# Patient Record
Sex: MALE | Race: ASIAN | URBAN - METROPOLITAN AREA
[De-identification: names, ages, dates, MRNs, and addresses within clinical notes are randomized per-mention and may not be internally consistent; named-entity substitution may affect disease eponyms.]

---

## 2017-02-16 ENCOUNTER — EMERGENCY (EMERGENCY)
Facility: HOSPITAL | Age: 37
LOS: 1 days | Discharge: HOME | End: 2017-02-16
Attending: EMERGENCY MEDICINE | Admitting: EMERGENCY MEDICINE
Payer: OTHER MISCELLANEOUS

## 2017-02-16 VITALS
TEMPERATURE: 98 F | DIASTOLIC BLOOD PRESSURE: 91 MMHG | HEART RATE: 73 BPM | RESPIRATION RATE: 16 BRPM | SYSTOLIC BLOOD PRESSURE: 130 MMHG | OXYGEN SATURATION: 100 %

## 2017-02-16 VITALS
OXYGEN SATURATION: 100 % | RESPIRATION RATE: 16 BRPM | DIASTOLIC BLOOD PRESSURE: 89 MMHG | HEART RATE: 68 BPM | SYSTOLIC BLOOD PRESSURE: 123 MMHG | TEMPERATURE: 98 F

## 2017-02-16 PROCEDURE — 73562 X-RAY EXAM OF KNEE 3: CPT | Mod: 26,LT

## 2017-02-16 PROCEDURE — 99283 EMERGENCY DEPT VISIT LOW MDM: CPT

## 2017-02-16 RX ORDER — IBUPROFEN 200 MG
1 TABLET ORAL
Qty: 30 | Refills: 0 | OUTPATIENT
Start: 2017-02-16

## 2017-02-16 RX ORDER — IBUPROFEN 200 MG
600 TABLET ORAL ONCE
Qty: 0 | Refills: 0 | Status: COMPLETED | OUTPATIENT
Start: 2017-02-16 | End: 2017-02-16

## 2017-02-16 RX ADMIN — Medication 600 MILLIGRAM(S): at 18:09

## 2017-02-16 NOTE — ED PROVIDER NOTE - PROGRESS NOTE DETAILS
SHIMA Pruitt: Received sign out from SHIMA COTO to reassess patient and follow up on xray results. Pt reassessed, still unable to bear weight although pain is better after Motrin. Likely meniscal injury. Will dc w/ knee immobilizer and crutches w/ ortho follow up

## 2017-02-16 NOTE — ED PROVIDER NOTE - LOWER EXTREMITY EXAM, LEFT
EFFUSION/TENDERNESS/Infrapatellar medial tenderness. Small effusion. Patient is able to extend knee passively. Pain on extension actively.  No tenderness over patellar tendon. Non tender ankle. FROM.  Hip: NT, FROM

## 2017-02-16 NOTE — ED PROCEDURE NOTE - NS ED PERI VASCULAR NEG
fingers/toes warm to touch/no paresthesia/no swelling/no cyanosis of extremity/capillary refill time < 2 seconds

## 2017-02-16 NOTE — ED PROVIDER NOTE - NS ED MD SCRIBE ATTENDING SCRIBE SECTIONS
HISTORY OF PRESENT ILLNESS/HIV/PAST MEDICAL/SURGICAL/SOCIAL HISTORY/REVIEW OF SYSTEMS/VITAL SIGNS( Pullset)/PHYSICAL EXAM/DISPOSITION

## 2017-02-16 NOTE — ED PROVIDER NOTE - ATTENDING CONTRIBUTION TO CARE
DR. Benjamin, UPFRONT ATTENDING MD-  I was the attending upfront in Triage today and I performed the initial face to face bedside interview with patient regarding history of present illness, review of symptoms and past medical history. I completed an independent physical exam.  Since I was the inital provider who evalauted this patient, the history, ROS and examination was documented by me in the note.  I have signed out the follow up of any pending tests (ie. labs and/or radiological studies) to the PA.  I have discussed patient's plan of care and disposition with QUYNH benjamin  The scribe's documentation has been prepared under my direction and personally reviewed by me in its entirety. I confirm that the note above accurately reflects all work, treatment, procedures, and medical decision making performed by hiren benjamin

## 2017-02-16 NOTE — ED PROVIDER NOTE - PLAN OF CARE
Follow up with ortho this week.  Referral list provided.  Rest, ice and elevate knee.  Ambulate as tolerated with use of crutches.  Keep knee immobilizer on during the day.  Take Motrin 600mg every 8 hrs for pain with food. Worsening pain, swelling, weakness, numbness return to ER

## 2017-02-16 NOTE — ED PROVIDER NOTE - OBJECTIVE STATEMENT
35 y/o M pt with no significant PMHx presents to the ED for sudden onset of non radiating left knee pain worsened with weight bearing and extension and minimal associated swelling s/p injury this evening at work in which he twisted his left knee while bending down to grab something. 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 No prior knee injury or surgeries. Denies ankle pain or hip pain.

## 2017-02-16 NOTE — ED ADULT TRIAGE NOTE - CHIEF COMPLAINT QUOTE
C/o left knee pain after pt bent down. Was unable to walk or bear weight on left knee. Denies fall/injury/past injury to knee.

## 2017-02-16 NOTE — ED PROVIDER NOTE - MEDICAL DECISION MAKING DETAILS
Plan: likely meniscal vs MCL injury vs sprain. Plan: XR to r/o fracture, pain control, likely knee immobilizer and discharge with ortho follow up.

## 2017-02-16 NOTE — ED PROVIDER NOTE - CARE PLAN
Principal Discharge DX:	Knee sprain  Instructions for follow-up, activity and diet:	Follow up with ortho this week.  Referral list provided.  Rest, ice and elevate knee.  Ambulate as tolerated with use of crutches.  Keep knee immobilizer on during the day.  Take Motrin 600mg every 8 hrs for pain with food. Worsening pain, swelling, weakness, numbness return to ER

## 2017-02-17 RX ORDER — IBUPROFEN 200 MG
1 TABLET ORAL
Qty: 30 | Refills: 0 | OUTPATIENT
Start: 2017-02-17

## 2017-02-17 NOTE — ED POST DISCHARGE NOTE - REASON FOR FOLLOW-UP
Other Pt called from [facility] stating rx was not at pharmacy. In prescription writer saw  mg was printed. Re-sent , esubmit successful.

## 2017-03-09 ENCOUNTER — APPOINTMENT (OUTPATIENT)
Dept: ORTHOPEDIC SURGERY | Facility: CLINIC | Age: 37
End: 2017-03-09

## 2017-03-09 VITALS
SYSTOLIC BLOOD PRESSURE: 124 MMHG | BODY MASS INDEX: 20.37 KG/M2 | DIASTOLIC BLOOD PRESSURE: 79 MMHG | WEIGHT: 142 LBS | HEART RATE: 74 BPM

## 2017-03-09 DIAGNOSIS — M25.562 PAIN IN LEFT KNEE: ICD-10-CM

## 2017-03-09 DIAGNOSIS — Z78.9 OTHER SPECIFIED HEALTH STATUS: ICD-10-CM

## 2017-03-09 DIAGNOSIS — Z60.2 PROBLEMS RELATED TO LIVING ALONE: ICD-10-CM

## 2017-03-09 DIAGNOSIS — Z80.42 FAMILY HISTORY OF MALIGNANT NEOPLASM OF PROSTATE: ICD-10-CM

## 2017-03-09 SDOH — SOCIAL STABILITY - SOCIAL INSECURITY: PROBLEMS RELATED TO LIVING ALONE: Z60.2

## 2017-03-23 ENCOUNTER — OUTPATIENT (OUTPATIENT)
Dept: OUTPATIENT SERVICES | Facility: HOSPITAL | Age: 37
LOS: 1 days | End: 2017-03-23
Payer: COMMERCIAL

## 2017-03-23 ENCOUNTER — APPOINTMENT (OUTPATIENT)
Dept: MRI IMAGING | Facility: CLINIC | Age: 37
End: 2017-03-23

## 2017-03-23 DIAGNOSIS — Z00.8 ENCOUNTER FOR OTHER GENERAL EXAMINATION: ICD-10-CM

## 2017-03-23 PROCEDURE — 73721 MRI JNT OF LWR EXTRE W/O DYE: CPT

## 2017-03-29 ENCOUNTER — OUTPATIENT (OUTPATIENT)
Dept: OUTPATIENT SERVICES | Facility: HOSPITAL | Age: 37
LOS: 1 days | End: 2017-03-29

## 2017-03-29 VITALS
HEART RATE: 72 BPM | WEIGHT: 149.03 LBS | HEIGHT: 70.5 IN | DIASTOLIC BLOOD PRESSURE: 82 MMHG | TEMPERATURE: 97 F | RESPIRATION RATE: 16 BRPM | SYSTOLIC BLOOD PRESSURE: 120 MMHG

## 2017-03-29 DIAGNOSIS — S83.242A OTHER TEAR OF MEDIAL MENISCUS, CURRENT INJURY, LEFT KNEE, INITIAL ENCOUNTER: ICD-10-CM

## 2017-03-29 DIAGNOSIS — S83.209A UNSPECIFIED TEAR OF UNSPECIFIED MENISCUS, CURRENT INJURY, UNSPECIFIED KNEE, INITIAL ENCOUNTER: ICD-10-CM

## 2017-03-29 LAB
HCT VFR BLD CALC: 40.1 % — SIGNIFICANT CHANGE UP (ref 39–50)
HGB BLD-MCNC: 12.2 G/DL — LOW (ref 13–17)
MCHC RBC-ENTMCNC: 23.6 PG — LOW (ref 27–34)
MCHC RBC-ENTMCNC: 30.4 % — LOW (ref 32–36)
MCV RBC AUTO: 77.7 FL — LOW (ref 80–100)
PLATELET # BLD AUTO: 210 K/UL — SIGNIFICANT CHANGE UP (ref 150–400)
PMV BLD: 12.3 FL — SIGNIFICANT CHANGE UP (ref 7–13)
RBC # BLD: 5.16 M/UL — SIGNIFICANT CHANGE UP (ref 4.2–5.8)
RBC # FLD: 13.5 % — SIGNIFICANT CHANGE UP (ref 10.3–14.5)
WBC # BLD: 5.92 K/UL — SIGNIFICANT CHANGE UP (ref 3.8–10.5)
WBC # FLD AUTO: 5.92 K/UL — SIGNIFICANT CHANGE UP (ref 3.8–10.5)

## 2017-03-29 NOTE — H&P PST ADULT - MUSCULOSKELETAL
details… detailed exam diminished strength/left knee/no calf tenderness/decreased ROM due to pain/joint swelling

## 2017-03-29 NOTE — H&P PST ADULT - NSANTHOSAYNRD_GEN_A_CORE
No. DUSTY screening performed.  STOP BANG Legend: 0-2 = LOW Risk; 3-4 = INTERMEDIATE Risk; 5-8 = HIGH Risk

## 2017-03-29 NOTE — H&P PST ADULT - HISTORY OF PRESENT ILLNESS
37 y/o healthy male presents for 37 y/o healthy male presents for preop evaluation for left knee medial meniscus tear. Pt states he injured his left knee on the job on2/16/17. Pt was lifting & squatting when he felt sharp pain  left knee; went to ED at [facility]; had x-ray of left knee & was referred to Dr Chiang for further evaluation Pt had MRI left knee & meniscus tear seen. Scheduled for left medial meniscus repair on 04/05/17.

## 2017-03-29 NOTE — H&P PST ADULT - FAMILY HISTORY
Mother  Still living? Unknown  Family history of diabetes mellitus (DM), Age at diagnosis: Age Unknown     Father  Still living? No  Family history of prostate cancer in father, Age at diagnosis: Age Unknown  Family history of diabetes mellitus (DM), Age at diagnosis: Age Unknown

## 2017-03-29 NOTE — H&P PST ADULT - RS GEN PE MLT RESP DETAILS PC
no rales/respirations non-labored/breath sounds equal/no wheezes/airway patent/clear to auscultation bilaterally/no rhonchi

## 2017-03-29 NOTE — H&P PST ADULT - PROBLEM SELECTOR PLAN 1
scheduled for left medial meniscus repair on 04/05/17  pending labs; surgical scrub & preop instructions  given & explained, pt verbalized understanding

## 2017-03-29 NOTE — H&P PST ADULT - NEGATIVE ENMT SYMPTOMS
no tinnitus/no nose bleeds/no vertigo/no hearing difficulty/no ear pain/no gum bleeding/no sinus symptoms/no nasal congestion

## 2017-03-29 NOTE — H&P PST ADULT - NEGATIVE GENERAL GENITOURINARY SYMPTOMS
no flank pain L/no flank pain R/no hematuria/no nocturia/no dysuria/no urinary hesitancy/no bladder infections/no renal colic/normal urinary frequency

## 2017-03-29 NOTE — H&P PST ADULT - NEGATIVE CARDIOVASCULAR SYMPTOMS
no peripheral edema/no chest pain/no dyspnea on exertion/no orthopnea/no paroxysmal nocturnal dyspnea/no palpitations

## 2017-04-04 ENCOUNTER — OTHER (OUTPATIENT)
Age: 37
End: 2017-04-04

## 2017-04-04 RX ORDER — OXYCODONE AND ACETAMINOPHEN 5; 325 MG/1; MG/1
5-325 TABLET ORAL
Qty: 50 | Refills: 0 | Status: ACTIVE | COMMUNITY
Start: 2017-04-04 | End: 1900-01-01

## 2017-04-05 ENCOUNTER — OUTPATIENT (OUTPATIENT)
Dept: OUTPATIENT SERVICES | Facility: HOSPITAL | Age: 37
LOS: 1 days | Discharge: HOME | End: 2017-04-05
Payer: OTHER MISCELLANEOUS

## 2017-04-05 ENCOUNTER — TRANSCRIPTION ENCOUNTER (OUTPATIENT)
Age: 37
End: 2017-04-05

## 2017-04-05 ENCOUNTER — APPOINTMENT (OUTPATIENT)
Dept: ORTHOPEDIC SURGERY | Facility: AMBULATORY SURGERY CENTER | Age: 37
End: 2017-04-05

## 2017-04-05 VITALS
SYSTOLIC BLOOD PRESSURE: 128 MMHG | HEART RATE: 55 BPM | OXYGEN SATURATION: 100 % | DIASTOLIC BLOOD PRESSURE: 64 MMHG | RESPIRATION RATE: 14 BRPM

## 2017-04-05 VITALS
DIASTOLIC BLOOD PRESSURE: 82 MMHG | SYSTOLIC BLOOD PRESSURE: 120 MMHG | HEART RATE: 72 BPM | TEMPERATURE: 97 F | HEIGHT: 70.5 IN | RESPIRATION RATE: 16 BRPM | WEIGHT: 149.03 LBS

## 2017-04-05 DIAGNOSIS — S83.242A OTHER TEAR OF MEDIAL MENISCUS, CURRENT INJURY, LEFT KNEE, INITIAL ENCOUNTER: ICD-10-CM

## 2017-04-05 PROCEDURE — 29881 ARTHRS KNE SRG MNISECTMY M/L: CPT | Mod: LT

## 2017-04-05 NOTE — ASU DISCHARGE PLAN (ADULT/PEDIATRIC). - SPECIAL INSTRUCTIONS
IF any written instructions differ from what your surgeon spoke with you about, please follow what your surgeon instructed over the pre-printed instructions. apply ice 20minutes ON 20minutes OFF

## 2017-04-05 NOTE — ASU DISCHARGE PLAN (ADULT/PEDIATRIC). - NOTIFY
Bleeding that does not stop/Persistent Nausea and Vomiting/Pain not relieved by Medications/Swelling that continues/Numbness, color, or temperature change to extremity/Fever greater than 101

## 2017-04-17 ENCOUNTER — APPOINTMENT (OUTPATIENT)
Dept: ORTHOPEDIC SURGERY | Facility: CLINIC | Age: 37
End: 2017-04-17

## 2017-05-01 ENCOUNTER — APPOINTMENT (OUTPATIENT)
Dept: ORTHOPEDIC SURGERY | Facility: CLINIC | Age: 37
End: 2017-05-01

## 2017-05-01 VITALS — HEIGHT: 70 IN | WEIGHT: 142 LBS | BODY MASS INDEX: 20.33 KG/M2

## 2017-06-01 ENCOUNTER — APPOINTMENT (OUTPATIENT)
Dept: ORTHOPEDIC SURGERY | Facility: CLINIC | Age: 37
End: 2017-06-01

## 2017-06-05 ENCOUNTER — MESSAGE (OUTPATIENT)
Age: 37
End: 2017-06-05

## 2017-07-13 ENCOUNTER — APPOINTMENT (OUTPATIENT)
Dept: ORTHOPEDIC SURGERY | Facility: CLINIC | Age: 37
End: 2017-07-13

## 2017-07-13 RX ORDER — NAPROXEN 500 MG/1
500 TABLET ORAL
Qty: 30 | Refills: 2 | Status: ACTIVE | COMMUNITY
Start: 2017-07-13 | End: 1900-01-01

## 2018-02-24 ENCOUNTER — EMERGENCY (EMERGENCY)
Facility: HOSPITAL | Age: 38
LOS: 1 days | Discharge: HOME | End: 2018-02-24
Attending: EMERGENCY MEDICINE | Admitting: EMERGENCY MEDICINE
Payer: COMMERCIAL

## 2018-02-24 VITALS
TEMPERATURE: 98 F | RESPIRATION RATE: 18 BRPM | DIASTOLIC BLOOD PRESSURE: 58 MMHG | HEART RATE: 83 BPM | SYSTOLIC BLOOD PRESSURE: 112 MMHG | OXYGEN SATURATION: 100 %

## 2018-02-24 PROCEDURE — 99284 EMERGENCY DEPT VISIT MOD MDM: CPT

## 2018-02-24 PROCEDURE — 71046 X-RAY EXAM CHEST 2 VIEWS: CPT | Mod: 26

## 2018-02-24 NOTE — ED PROVIDER NOTE - ATTENDING CONTRIBUTION TO CARE
I performed a face to face bedside interview with patient regarding history of present illness, review of symptoms and past medical history. I completed an independent physical exam.  I have discussed patient's plan of care.   I agree with note as stated above, having amended the EMR as needed to reflect my findings. I have discussed the assessment and plan of care.  This includes during the time I functioned as the attending physician for this patient.  Attending Contribution to Care: agree with plan of resident. pt p/w cp, ekg nsr with early repol. no hocm, brugada, prolonged qt. cxr neg. symptomatically improved. stable for d/c.

## 2018-02-24 NOTE — ED PROVIDER NOTE - MEDICAL DECISION MAKING DETAILS
37 healthy male with chest pain sent by Urgent care for concerning EKG.  EKG shows early repolirazation.  Pain improved at this time.  Pt PERC negative.  Will obtain cxr, repeat EKG and reassess.  - Viola Kurtz DO

## 2018-02-24 NOTE — ED PROVIDER NOTE - OBJECTIVE STATEMENT
37M no pmh p/w chest pain since last night.  Pt states pain was burning, worse with movement and worse laying down.  Pain is left sided and sometimes he feels in in his left side and left back.   Pt was at work today and pain was worse so he went to urgent care where they did and EKG, gave a "shot" for pain and sent patient to ED due to EKG.  Pt states his pain is improved and down to a 3/10 at this moment.  Denies associated shortness of breath, nausea/vomiting/diarrhea, fever, chills.  No fam hx of cardiac disease.  NO recent trave, no sick contacts.  - Viola Kurtz,

## 2018-02-24 NOTE — ED ADULT TRIAGE NOTE - CHIEF COMPLAINT QUOTE
pt developed cp at work and then went to pmd who did ekg and was told to come to er . pt had left sided cp thru to back worse on inspiration and when lying down. pt was given a shot for pain in office/ does not know the name/ states 4/10 now

## 2019-04-11 ENCOUNTER — APPOINTMENT (OUTPATIENT)
Dept: ORTHOPEDIC SURGERY | Facility: CLINIC | Age: 39
End: 2019-04-11
Payer: OTHER MISCELLANEOUS

## 2019-04-11 DIAGNOSIS — S83.212D BUCKET-HANDLE TEAR OF MEDIAL MENISCUS, CURRENT INJURY, LEFT KNEE, SUBSEQUENT ENCOUNTER: ICD-10-CM

## 2019-04-11 PROCEDURE — 99214 OFFICE O/P EST MOD 30 MIN: CPT

## 2019-04-11 NOTE — HISTORY OF PRESENT ILLNESS
[de-identified] : 38 year old male status post left knee bucket handle medial meniscectomy on 4.5.17, presents for follow up. He is here to close his WC case. He reports intermittent pain with squatting. Patient initially struggled with his postoperative recovery despite eradication of medial joint pain and locking sensation. Patient reported patellofemoral crepitus and loss of patellofemoral function postoperatively. Continues to report apprehension with return to high impact activity.

## 2019-04-11 NOTE — DISCUSSION/SUMMARY
[de-identified] : 38-year-old male status post left knee bucket-handle medial meniscectomy\par \par Discussed with patient that he has some mild loss of flexion, with hesitation to return to impact loading activity. Discussed that given partial meniscectomy, the patient will likely have continued intermittent symptoms given loss of meniscal function.\par \par Recommendation: Activity to tolerance. No restriction.\par \par Followup p.r.n.\par \par **Workers Compensation Percentage Loss (%);**\par \par Mild loss flexion 130deg vs 145deg = 10%\par \par The above evaluation was performed by myself, a board certified orthopedic surgeon, and represents my best objective professional opinion of the claimants medical condition, medical impairment and functional abilities. Percentage loss was calculated per [place] Workers Compensation Guidlines for Determining Impairment as per Nov 22nd, 2017.

## 2019-04-11 NOTE — PHYSICAL EXAM
[de-identified] : Oriented to time, place, person\par Mood: Normal\par Affect: Normal\par Appearance: Healthy, well appearing, no acute distress.\par Gait: Normal\par Assistive Devices: None\par \par Left knee exam\par \par Skin: Clean, dry, intact\par Inspection: No obvious malalignment, no masses, no swelling, no effusion\par Pulses: 2+ DP/PT pulses\par ROM: 0-130 degrees of flexion (R=145). Mild pain with deep knee flexion.\par Tenderness: Trace MJLT. No LJLT. Mild pain over the patella facets. No pain to the quadriceps tendon. No pain to the patella tendon. No posterior knee tenderness.\par Stability: Stable to varus, valgus. Negative lachman testing. Negative anterior drawer, negative posterior drawer.\par Strength: 5/5 Q/H/TA/GS/EHL, without atrophy\par Neuro: In tact to light touch throughout, DTR's normal\par Additional tests: Negative McMurrays test, Negative patellar grind test. \par \par

## 2019-08-14 NOTE — BRIEF OPERATIVE NOTE - ASSISTANT(S)
yolanda miller Surgeon/Pathologist Verbiage (Will Incorporate Name Of Surgeon From Intro If Not Blank): operated in two distinct and integrated capacities as the surgeon and pathologist.

## 2019-08-23 ENCOUNTER — APPOINTMENT (OUTPATIENT)
Dept: ULTRASOUND IMAGING | Facility: IMAGING CENTER | Age: 39
End: 2019-08-23

## 2019-08-23 ENCOUNTER — OUTPATIENT (OUTPATIENT)
Dept: OUTPATIENT SERVICES | Facility: HOSPITAL | Age: 39
LOS: 1 days | End: 2019-08-23

## 2019-08-23 DIAGNOSIS — B18.1 CHRONIC VIRAL HEPATITIS B WITHOUT DELTA-AGENT: ICD-10-CM

## 2019-08-24 ENCOUNTER — APPOINTMENT (OUTPATIENT)
Dept: ULTRASOUND IMAGING | Facility: IMAGING CENTER | Age: 39
End: 2019-08-24
Payer: COMMERCIAL

## 2019-08-24 ENCOUNTER — OUTPATIENT (OUTPATIENT)
Dept: OUTPATIENT SERVICES | Facility: HOSPITAL | Age: 39
LOS: 1 days | End: 2019-08-24
Payer: COMMERCIAL

## 2019-08-24 DIAGNOSIS — B18.1 CHRONIC VIRAL HEPATITIS B WITHOUT DELTA-AGENT: ICD-10-CM

## 2019-08-24 PROCEDURE — 76705 ECHO EXAM OF ABDOMEN: CPT

## 2019-08-24 PROCEDURE — 76705 ECHO EXAM OF ABDOMEN: CPT | Mod: 26

## 2019-10-02 PROBLEM — Z60.2 PERSON LIVING ALONE: Status: ACTIVE | Noted: 2017-03-09

## 2020-01-23 ENCOUNTER — EMERGENCY (EMERGENCY)
Facility: HOSPITAL | Age: 40
LOS: 1 days | Discharge: HOME | End: 2020-01-23
Attending: EMERGENCY MEDICINE | Admitting: EMERGENCY MEDICINE
Payer: COMMERCIAL

## 2020-01-23 VITALS
SYSTOLIC BLOOD PRESSURE: 113 MMHG | TEMPERATURE: 98 F | DIASTOLIC BLOOD PRESSURE: 73 MMHG | HEART RATE: 86 BPM | OXYGEN SATURATION: 100 % | RESPIRATION RATE: 17 BRPM

## 2020-01-23 PROCEDURE — 99283 EMERGENCY DEPT VISIT LOW MDM: CPT

## 2020-01-23 RX ORDER — TETANUS TOXOID, REDUCED DIPHTHERIA TOXOID AND ACELLULAR PERTUSSIS VACCINE, ADSORBED 5; 2.5; 8; 8; 2.5 [IU]/.5ML; [IU]/.5ML; UG/.5ML; UG/.5ML; UG/.5ML
0.5 SUSPENSION INTRAMUSCULAR ONCE
Refills: 0 | Status: COMPLETED | OUTPATIENT
Start: 2020-01-23 | End: 2020-01-23

## 2020-01-23 RX ADMIN — TETANUS TOXOID, REDUCED DIPHTHERIA TOXOID AND ACELLULAR PERTUSSIS VACCINE, ADSORBED 0.5 MILLILITER(S): 5; 2.5; 8; 8; 2.5 SUSPENSION INTRAMUSCULAR at 19:02
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
## 2020-01-23 NOTE — ED PROVIDER NOTE - PATIENT PORTAL LINK FT
You can access the FollowMyHealth Patient Portal offered by [facility] by registering at the following website: [email or web address]. By joining [employer]’s FollowMyHealth portal, you will also be able to view your health information using other applications (apps) compatible with our system.
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
## 2020-02-04 ENCOUNTER — EMERGENCY (EMERGENCY)
Facility: HOSPITAL | Age: 40
LOS: 1 days | Discharge: HOME | End: 2020-02-04
Attending: EMERGENCY MEDICINE | Admitting: EMERGENCY MEDICINE
Payer: COMMERCIAL

## 2020-02-04 VITALS
OXYGEN SATURATION: 99 % | TEMPERATURE: 98 F | SYSTOLIC BLOOD PRESSURE: 119 MMHG | DIASTOLIC BLOOD PRESSURE: 82 MMHG | RESPIRATION RATE: 16 BRPM | HEART RATE: 90 BPM

## 2020-02-04 PROCEDURE — 99282 EMERGENCY DEPT VISIT SF MDM: CPT
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
## 2020-02-04 NOTE — ED PROVIDER NOTE - PATIENT PORTAL LINK FT
You can access the FollowMyHealth Patient Portal offered by [facility] by registering at the following website: [email or web address]. By joining [email or web address]’s FollowMyHealth portal, you will also be able to view your health information using other applications (apps) compatible with our system.
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
## 2020-02-04 NOTE — ED PROVIDER NOTE - NSFOLLOWUPINSTRUCTIONS_ED_ALL_ED_FT
Please follow up with employee health for full clearance. From a medical standpoint there are no restrictions in terms of wetting the area. Return to the Emergency Department for any worsening or concerning symptoms.

## 2020-02-26 ENCOUNTER — OUTPATIENT (OUTPATIENT)
Dept: OUTPATIENT SERVICES | Facility: HOSPITAL | Age: 40
LOS: 1 days | End: 2020-02-26
Payer: COMMERCIAL

## 2020-02-26 ENCOUNTER — APPOINTMENT (OUTPATIENT)
Dept: ULTRASOUND IMAGING | Facility: HOSPITAL | Age: 40
End: 2020-02-26
Payer: COMMERCIAL

## 2020-02-26 DIAGNOSIS — B18.1 CHRONIC VIRAL HEPATITIS B WITHOUT DELTA-AGENT: ICD-10-CM

## 2020-02-26 PROCEDURE — 76700 US EXAM ABDOM COMPLETE: CPT | Mod: 26

## 2020-02-26 PROCEDURE — 76700 US EXAM ABDOM COMPLETE: CPT

## 2020-04-26 ENCOUNTER — MESSAGE (OUTPATIENT)
Age: 40
End: 2020-04-26

## 2022-04-12 NOTE — H&P PST ADULT - MARITAL STATUS
Patient Education        Abdominal Pain: Care Instructions  Your Care Instructions     Abdominal pain has many possible causes. Some aren't serious and get better on their own in a few days. Others need more testing and treatment. If your pain continues or gets worse, you need to be rechecked and may need more tests tofind out what is wrong. You may need surgery to correct the problem. Don't ignore new symptoms, such as fever, nausea and vomiting, urination problems, pain that gets worse, and dizziness. These may be signs of a moreserious problem. Your doctor may have recommended a follow-up visit in the next 8 to 12 hours. If you are not getting better, you may need more tests or treatment. The doctor has checked you carefully, but problems can develop later. If you notice any problems or new symptoms, get medical treatment right away. Follow-up care is a key part of your treatment and safety. Be sure to make and go to all appointments, and call your doctor if you are having problems. It's also a good idea to know your test results and keep alist of the medicines you take. How can you care for yourself at home?  Rest until you feel better.  To prevent dehydration, drink plenty of fluids. Choose water and other clear liquids until you feel better. If you have kidney, heart, or liver disease and have to limit fluids, talk with your doctor before you increase the amount of fluids you drink.  If your stomach is upset, eat mild foods, such as rice, dry toast or crackers, bananas, and applesauce. Try eating several small meals instead of two or three large ones.  Wait until 48 hours after all symptoms have gone away before you have spicy foods, alcohol, and drinks that contain caffeine.  Do not eat foods that are high in fat.  Avoid anti-inflammatory medicines such as aspirin, ibuprofen (Advil, Motrin), and naproxen (Aleve). These can cause stomach upset.  Talk to your doctor if you take daily aspirin for another health problem. When should you call for help? Call 911 anytime you think you may need emergency care. For example, call if:     You passed out (lost consciousness).      You pass maroon or very bloody stools.      You vomit blood or what looks like coffee grounds.      You have new, severe belly pain. Call your doctor now or seek immediate medical care if:     Your pain gets worse, especially if it becomes focused in one area of your belly.      You have a new or higher fever.      Your stools are black and look like tar, or they have streaks of blood.      You have unexpected vaginal bleeding.      You have symptoms of a urinary tract infection. These may include:  ? Pain when you urinate. ? Urinating more often than usual.  ? Blood in your urine.      You are dizzy or lightheaded, or you feel like you may faint. Watch closely for changes in your health, and be sure to contact your doctor if:     You are not getting better after 1 day (24 hours). Where can you learn more? Go to https://[employer]. org and sign in to your [employer] account. Enter U722 in the Hudl box to learn [facility] ([facility]). Single

## 2023-09-26 NOTE — ED PROVIDER NOTE - MDM ORDERS SUBMITTED SELECTION
Medications Bed/Stretcher in lowest position, wheels locked, appropriate side rails in place/Call bell, personal items and telephone in reach/Instruct patient to call for assistance before getting out of bed/chair/stretcher/Non-slip footwear applied when patient is off stretcher/[place] to call system/Physically safe environment - no spills, clutter or unnecessary equipment/Purposeful proactive rounding/Room/bathroom lighting operational, light cord in reach